# Patient Record
Sex: MALE | Race: BLACK OR AFRICAN AMERICAN | Employment: UNEMPLOYED | URBAN - METROPOLITAN AREA
[De-identification: names, ages, dates, MRNs, and addresses within clinical notes are randomized per-mention and may not be internally consistent; named-entity substitution may affect disease eponyms.]

---

## 2017-03-28 ENCOUNTER — HOSPITAL ENCOUNTER (EMERGENCY)
Age: 57
Discharge: HOME | End: 2017-03-28
Attending: EMERGENCY MEDICINE
Payer: MEDICAID

## 2017-03-28 VITALS
SYSTOLIC BLOOD PRESSURE: 140 MMHG | HEIGHT: 72 IN | RESPIRATION RATE: 18 BRPM | WEIGHT: 225 LBS | TEMPERATURE: 98.3 F | DIASTOLIC BLOOD PRESSURE: 82 MMHG | HEART RATE: 80 BPM | OXYGEN SATURATION: 97 % | BODY MASS INDEX: 30.48 KG/M2

## 2017-03-28 DIAGNOSIS — L02.91 ABSCESS: Primary | ICD-10-CM

## 2017-03-28 PROCEDURE — 99282 EMERGENCY DEPT VISIT SF MDM: CPT

## 2017-03-28 RX ORDER — DOXYCYCLINE HYCLATE 100 MG
100 TABLET ORAL 2 TIMES DAILY
Qty: 14 TAB | Refills: 0 | Status: SHIPPED | OUTPATIENT
Start: 2017-03-28 | End: 2017-04-04

## 2017-03-28 NOTE — ED PROVIDER NOTES
Patient is a 49 y.o. male presenting with abscess. The history is provided by the patient. Abscess      Brooke Marks is a 49 y.o. male presents with abscess under left arm for the past week. States has been draining on it's own. Denies fever or n/v. Denies previous history. Past Medical History:   Diagnosis Date    Diabetes ([address]     Hypertension        Past Surgical History:   Procedure Laterality Date    HX APPENDECTOMY      HX ORTHOPAEDIC      back, neck, left hand x 2         Family History:   Problem Relation Age of Onset    Diabetes Brother        Social History     Social History    Marital status:      Spouse name: N/A    Number of children: N/A    Years of education: N/A     Occupational History    Not on file. Social History Main Topics    Smoking status: Never Smoker    Smokeless tobacco: Never Used    Alcohol use No    Drug use: No    Sexual activity: Not on file     Other Topics Concern    Not on file     Social History Narrative         ALLERGIES: Review of patient's allergies indicates no known allergies. Review of Systems  Constitutional:  Denies malaise, fever, chills. Head:  Denies injury. Face:  Denies injury or pain. ENMT:  Denies sore throat. Neck:  Denies injury or pain. Chest:  Denies injury. Cardiac:  Denies chest pain or palpitations. Respiratory:  Denies cough, wheezing, difficulty breathing, shortness of breath. GI/ABD:  Denies injury, pain, distention, nausea, vomiting, diarrhea. :  Denies injury, pain, dysuria or urgency. Back:  Denies injury or pain. Pelvis:  Denies injury or pain. Extremity/MS:  Denies injury or pain. Neuro:  Denies headache, LOC, dizziness, neurologic symptoms/deficits/paresthesias.    Skin: Abscess    Vitals:    03/28/17 1120   BP: 140/82   Pulse: 80   Resp: 18   Temp: 98.3 °F (36.8 °C)   SpO2: 97%   Weight: 102.1 kg (225 lb)   Height: 5' 11.5\" (1.816 m)            Physical Exam   Nursing note and vitals reviewed. CONSTITUTIONAL: Alert, in no apparent distress; well-developed; well-nourished. HEAD:  Normocephalic, atraumatic. EYES: PERRL; EOM's intact. ENTM: Nose: No rhinorrhea; Throat: mucous membranes moist. Posterior pharynx-normal.  Neck:  No JVD, supple without lymphadenopathy. RESP: Chest clear, equal breath sounds. CV: S1 and S2 WNL; No murmurs, gallops or rubs. GI: Abdomen soft and non-tender. No masses or organomegaly. UPPER EXT:  Normal inspection. LOWER EXT: Normal inspection. NEURO: strength 5/5 and sym, sensation intact. SKIN: Left axilla with well draining non tender, no erythema, no induration. PSYCH:  Alert and oriented, normal affect. MDM  Number of Diagnoses or Management Options  Abscess:   Diagnosis management comments: DDx: eczema/allergic dermatitis/contact dermatitis, erysipelas, bug bites, abscess, cellulitis, viral exanthem, scabies,  skin eruption associated with life-threatening condition  IMPRESSION AND MEDICAL DECISION MAKING:  Based upon the patient's presentation with noted HPI and PE, along with the work up done in the emergency department, I believe that the patient has a well draining abscess. Do not believe need to open further at this time. Will start on Doxy and have him wash thoroughly daily. The patient will be discharged home. Warning signs of worsening condition were discussed and understood by the patient. Based on patient's age, coexisting illness, exam, and the results of this ED evaluation, the decision to treat as an outpatient was made. Based on the information available at time of discharge, acute pathology requiring immediate intervention was deemed relative unlikely. While it is impossible to completely exclude the possibility of underlying serious disease or worsening of condition, I feel the relative likelihood is extremely low.  I discussed this uncertainty with the patient, who understood ED evaluation and treatment and felt comfortable with the outpatient treatment plan. All questions regarding care, test results, and follow up were answered. The patient is stable and appropriate to discharge. They understand that they should return to the emergency department for any new or worsening symptoms. I stressed the importance of follow up for repeat assessment and possibly further evaluation/treatment.         ED Course       Procedures

## 2017-03-28 NOTE — DISCHARGE INSTRUCTIONS

## 2020-12-15 ENCOUNTER — OFFICE VISIT (OUTPATIENT)
Dept: ORTHOPEDIC SURGERY | Age: 60
End: 2020-12-15
Payer: MEDICAID

## 2020-12-15 VITALS
SYSTOLIC BLOOD PRESSURE: 189 MMHG | TEMPERATURE: 96.2 F | DIASTOLIC BLOOD PRESSURE: 97 MMHG | BODY MASS INDEX: 29.47 KG/M2 | WEIGHT: 217.6 LBS | HEART RATE: 70 BPM | HEIGHT: 72 IN | RESPIRATION RATE: 16 BRPM | OXYGEN SATURATION: 98 %

## 2020-12-15 DIAGNOSIS — M79.671 RIGHT FOOT PAIN: ICD-10-CM

## 2020-12-15 DIAGNOSIS — E11.40 NEUROPATHY DUE TO TYPE 2 DIABETES MELLITUS (HCC): Primary | ICD-10-CM

## 2020-12-15 DIAGNOSIS — M79.672 LEFT FOOT PAIN: ICD-10-CM

## 2020-12-15 DIAGNOSIS — L84 SKIN CALLUS: ICD-10-CM

## 2020-12-15 PROCEDURE — 73630 X-RAY EXAM OF FOOT: CPT | Performed by: ORTHOPAEDIC SURGERY

## 2020-12-15 PROCEDURE — 99204 OFFICE O/P NEW MOD 45 MIN: CPT | Performed by: ORTHOPAEDIC SURGERY

## 2020-12-15 PROCEDURE — 11055 PARING/CUTG B9 HYPRKER LES 1: CPT | Performed by: ORTHOPAEDIC SURGERY

## 2020-12-15 RX ORDER — GABAPENTIN 300 MG/1
CAPSULE ORAL
Qty: 21 CAP | Refills: 0 | Status: SHIPPED | OUTPATIENT
Start: 2020-12-15 | End: 2021-01-20

## 2020-12-15 RX ORDER — AMMONIUM LACTATE 5 %
LOTION (GRAM) TOPICAL 2 TIMES DAILY
Qty: 1 BOTTLE | Refills: 0 | Status: SHIPPED | OUTPATIENT
Start: 2020-12-15
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
## 2020-12-15 NOTE — PATIENT INSTRUCTIONS
You have been provided with an order for durable medical equipment that you may  at an outside facility as our office does not carry the equipment you need. You may pick it up at any medical supply company you like. Listed below are a few different locations for your convenience: 
 
[facility] Phone: [phone] [place]:  
[address] [facility], [address] [place], [address] [place]: 
Theresa Daval[facility] [address] [facility] [address] [place]: 
[address] [facility], [address] 
 
bilateral custom diabetic inserts

## 2020-12-18 ENCOUNTER — TELEPHONE (OUTPATIENT)
Dept: ORTHOPEDIC SURGERY | Age: 60
End: 2020-12-18

## 2020-12-18 NOTE — TELEPHONE ENCOUNTER
Contacted patient to reschedule due to provider will be out of the office on 1/18/20. Patient stated Dr. Julius Hernandez was supposed to send a cream to the pharmacist for his foot but it was never submitted.  Patient can be reached at  [phone]

## 2020-12-18 NOTE — TELEPHONE ENCOUNTER
Looks like the Lac Hydrin was sent to his pharmacy.  I spoke to patient and relayed this info and told him to call us back if they need more info from us

## 2021-01-16 DIAGNOSIS — E11.40 NEUROPATHY DUE TO TYPE 2 DIABETES MELLITUS (HCC): ICD-10-CM

## 2021-01-20 RX ORDER — GABAPENTIN 300 MG/1
CAPSULE ORAL
Qty: 21 CAP | Refills: 2 | Status: SHIPPED | OUTPATIENT
Start: 2021-01-20

## 2021-08-13 ENCOUNTER — OFFICE VISIT (OUTPATIENT)
Dept: ORTHOPEDIC SURGERY | Age: 61
End: 2021-08-13
Payer: MEDICAID

## 2021-08-13 VITALS
WEIGHT: 223 LBS | BODY MASS INDEX: 31.22 KG/M2 | TEMPERATURE: 98 F | OXYGEN SATURATION: 98 % | HEIGHT: 71 IN | HEART RATE: 73 BPM | RESPIRATION RATE: 18 BRPM

## 2021-08-13 DIAGNOSIS — M25.562 LEFT KNEE PAIN, UNSPECIFIED CHRONICITY: ICD-10-CM

## 2021-08-13 DIAGNOSIS — M17.12 ARTHRITIS OF LEFT KNEE: Primary | ICD-10-CM

## 2021-08-13 PROCEDURE — 99214 OFFICE O/P EST MOD 30 MIN: CPT | Performed by: PHYSICIAN ASSISTANT

## 2021-08-13 PROCEDURE — 73564 X-RAY EXAM KNEE 4 OR MORE: CPT | Performed by: PHYSICIAN ASSISTANT

## 2021-08-13 NOTE — PROGRESS NOTES
[address] [place] [address]  [phone]           Patient: Rose Faulkner                MRN: [id number]       SSN: [id number]  YOB: 1960        AGE: 63 y.o. SEX: male  Body mass index is 31.1 kg/m². PCP: None  08/13/21      This office note has been dictated. REVIEW OF SYSTEMS:  Constitutional: Negative for fever, chills, weight loss and malaise/fatigue. HENT: Negative. Eyes: Negative. Respiratory: Negative. Cardiovascular: Negative. Gastrointestinal: No bowel incontinence or constipation. Genitourinary: No bladder incontinence or saddle anesthesia. Skin: Negative. Neurological: Negative. Endo/Heme/Allergies: Negative. Psychiatric/Behavioral: Negative. Musculoskeletal: As per HPI above. Past Medical History:   Diagnosis Date    Diabetes ([facility] [address]     Hypertension          Current Outpatient Medications:     gabapentin (NEURONTIN) 300 mg capsule, take 1 capsule by mouth every NIGHT BEFORE BEDTIME for 7 days then 2 capsules every NIGHT BEFORE BEDTIME for THE NEXT 7 DAYS, Disp: 21 Cap, Rfl: 2    L-Mfolate-B6 Phos-Methyl-B12 (METANX) 3-35-2 mg tab tab, Take 1 Tab by mouth daily. , Disp: 30 Tab, Rfl: 0    ranitidine (ZANTAC) 150 mg tablet, Take 1 Tab by mouth two (2) times a day., Disp: 60 Tab, Rfl: 2    cyclobenzaprine (FLEXERIL) 10 mg tablet, Take 1 Tab by mouth two (2) times a day., Disp: 60 Tab, Rfl: 1    metFORMIN (GLUCOPHAGE) 1,000 mg tablet, Take 1 Tab by mouth two (2) times daily (with meals). , Disp: 60 Tab, Rfl: 5    lisinopril (PRINIVIL, ZESTRIL) 10 mg tablet, Take 1 Tab by mouth daily. , Disp: 30 Tab, Rfl: 2    ammonium lactate (Lac-Hydrin Five) 5 % lotion, Apply  to affected area two (2) times a day. (Patient not taking: Reported on 8/13/2021), Disp: 1 Bottle, Rfl: 0    naproxen (NAPROSYN) 500 mg tablet, Take 1 Tab by mouth two (2) times daily (with meals).  (Patient not taking: Reported on 8/13/2021), Disp: 30 Tab, Rfl: 1    No Known Allergies    Social History     Socioeconomic History    Marital status: SINGLE     Spouse name: Not on file    Number of children: Not on file    Years of education: Not on file    Highest education level: Not on file   Occupational History    Not on file   Tobacco Use    Smoking status: Never Smoker    Smokeless tobacco: Never Used   Vaping Use    Vaping Use: Never used   Substance and Sexual Activity    Alcohol use: No    Drug use: No    Sexual activity: Not on file   Other Topics Concern    Not on file   Social History Narrative    Not on file     Social Determinants of Health     Financial Resource Strain:     Difficulty of Paying Living Expenses:    Food Insecurity:     Worried About Running Out of Food in the Last Year:     [address] in the Last Year:    Transportation Needs:     Lack of Transportation (Medical):  Lack of Transportation (Non-Medical):    Physical Activity:     Days of Exercise per Week:     Minutes of Exercise per Session:    Stress:     Feeling of Stress :    Social Connections:     Frequency of Communication with Friends and Family:     Frequency of Social Gatherings with Friends and Family:     Attends Synagogue Services:     Active Member of Clubs or Organizations:     Attends Club or Organization Meetings:     Marital Status:    Intimate Partner Violence:     Fear of Current or Ex-Partner:     Emotionally Abused:     Physically Abused:     Sexually Abused:        Past Surgical History:   Procedure Laterality Date    HX APPENDECTOMY      HX ORTHOPAEDIC      back, neck, left hand x 2           Patient seen and evaluated today with complaints of left knee pain. Patient states she has had some left knee pain and swelling since has been doing physical therapy for his back. He denies any falls. He does report discomfort inside part of his knee which is worse with ambulation.   He does have pseudomechanical symptomatology. He denies any radiating pain down the lower extremity. He does continue on Mobic. Patient denies recent fevers, chills, chest pain, SOB, or injuries. No recent systemic changes noted. A 12-point review of systems is performed today. Pertinent positives are noted. All other systems reviewed and otherwise are negative. Physical exam: General: Alert and oriented x3, nad.  well-developed, well nourished. normal affect, AF. NC/AT, EOMI, neck supple, trachea midline, no JVD present. Breathing is non-labored. Examination of the lower extremities reveals pain-free range of motion the hips. There is no pain to palpation the trochanter bursa. Neck straight leg raise. Negative calf tenderness. Negative Homans. No signs of DVT present. The left knee reveals skin intact. There is no erythema or ecchymosis noted. There is discomfort to palpation medial joint line as well as discomfort Gus's with palpable click present medially. Range of motion is well-preserved. Radiographs obtained in the office today 8/13/2021 at the [facility] location including AP, tunnel, lateral, skyline of the left knee shows no acute bony abnormalities. Assessment: Left knee pain rule out meniscal pathology    Plan: At this point, we will move forward with an MRI of the left knee for further evaluation. We will see him back afterwards for review and treatment options. Arthroscopy was discussed with the patient today in office. He will continue on his Mobic. He will continue with ice therapy. He will continue with Tylenol over-the-counter.             JR Kunal LIN, LEXI, ATC

## 2021-10-29 ENCOUNTER — HOSPITAL ENCOUNTER (OUTPATIENT)
Age: 61
Discharge: HOME | End: 2021-10-29
Attending: PHYSICIAN ASSISTANT
Payer: MEDICAID

## 2021-10-29 DIAGNOSIS — M25.562 LEFT KNEE PAIN, UNSPECIFIED CHRONICITY: ICD-10-CM

## 2021-10-29 PROCEDURE — 73721 MRI JNT OF LWR EXTRE W/O DYE: CPT

## 2021-12-07 ENCOUNTER — OFFICE VISIT (OUTPATIENT)
Dept: ORTHOPEDIC SURGERY | Age: 61
End: 2021-12-07
Payer: MEDICAID

## 2021-12-07 VITALS
OXYGEN SATURATION: 98 % | TEMPERATURE: 97.2 F | HEIGHT: 72 IN | BODY MASS INDEX: 30.96 KG/M2 | HEART RATE: 84 BPM | WEIGHT: 228.6 LBS

## 2021-12-07 DIAGNOSIS — M25.562 LEFT KNEE PAIN, UNSPECIFIED CHRONICITY: ICD-10-CM

## 2021-12-07 DIAGNOSIS — M17.12 ARTHRITIS OF LEFT KNEE: Primary | ICD-10-CM

## 2021-12-07 PROCEDURE — 99214 OFFICE O/P EST MOD 30 MIN: CPT | Performed by: PHYSICIAN ASSISTANT

## 2021-12-07 RX ORDER — MELOXICAM 15 MG/1
15 TABLET ORAL DAILY
Qty: 30 TABLET | Refills: 2 | Status: SHIPPED | OUTPATIENT
Start: 2021-12-07 | End: 2022-08-22

## 2021-12-07 NOTE — PROGRESS NOTES
[address] [place] [address]  [phone]           Patient: Opal Ferreira                MRN: [id number]       SSN: [id number]  YOB: 1960        AGE: 63 y.o. SEX: male  Body mass index is 31.44 kg/m². PCP: None  12/07/21      This office note has been dictated. REVIEW OF SYSTEMS:  Constitutional: Negative for fever, chills, weight loss and malaise/fatigue. HENT: Negative. Eyes: Negative. Respiratory: Negative. Cardiovascular: Negative. Gastrointestinal: No bowel incontinence or constipation. Genitourinary: No bladder incontinence or saddle anesthesia. Skin: Negative. Neurological: Negative. Endo/Heme/Allergies: Negative. Psychiatric/Behavioral: Negative. Musculoskeletal: As per HPI above. Past Medical History:   Diagnosis Date    Diabetes ([facility] [address]     Hypertension          Current Outpatient Medications:     gabapentin (NEURONTIN) 300 mg capsule, take 1 capsule by mouth every NIGHT BEFORE BEDTIME for 7 days then 2 capsules every NIGHT BEFORE BEDTIME for THE NEXT 7 DAYS, Disp: 21 Cap, Rfl: 2    L-Mfolate-B6 Phos-Methyl-B12 (METANX) 3-35-2 mg tab tab, Take 1 Tab by mouth daily. , Disp: 30 Tab, Rfl: 0    ranitidine (ZANTAC) 150 mg tablet, Take 1 Tab by mouth two (2) times a day., Disp: 60 Tab, Rfl: 2    cyclobenzaprine (FLEXERIL) 10 mg tablet, Take 1 Tab by mouth two (2) times a day., Disp: 60 Tab, Rfl: 1    metFORMIN (GLUCOPHAGE) 1,000 mg tablet, Take 1 Tab by mouth two (2) times daily (with meals). , Disp: 60 Tab, Rfl: 5    lisinopril (PRINIVIL, ZESTRIL) 10 mg tablet, Take 1 Tab by mouth daily. , Disp: 30 Tab, Rfl: 2    ammonium lactate (Lac-Hydrin Five) 5 % lotion, Apply  to affected area two (2) times a day. (Patient not taking: Reported on 8/13/2021), Disp: 1 Bottle, Rfl: 0    naproxen (NAPROSYN) 500 mg tablet, Take 1 Tab by mouth two (2) times daily (with meals).  (Patient not taking: Reported on 8/13/2021), Disp: 30 Tab, Rfl: 1    No Known Allergies    Social History     Socioeconomic History    Marital status: SINGLE     Spouse name: Not on file    Number of children: Not on file    Years of education: Not on file    Highest education level: Not on file   Occupational History    Not on file   Tobacco Use    Smoking status: Never Smoker    Smokeless tobacco: Never Used   Vaping Use    Vaping Use: Never used   Substance and Sexual Activity    Alcohol use: No    Drug use: No    Sexual activity: Not on file   Other Topics Concern    Not on file   Social History Narrative    Not on file     Social Determinants of Health     Financial Resource Strain:     Difficulty of Paying Living Expenses: Not on file   Food Insecurity:     Worried About Running Out of Food in the Last Year: Not on file    [place] of Food in the Last Year: Not on file   Transportation Needs:     Lack of Transportation (Medical): Not on file    Lack of Transportation (Non-Medical):  Not on file   Physical Activity:     Days of Exercise per Week: Not on file    Minutes of Exercise per Session: Not on file   Stress:     Feeling of Stress : Not on file   Social Connections:     Frequency of Communication with Friends and Family: Not on file    Frequency of Social Gatherings with Friends and Family: Not on file    Attends Ep[employer] Services: Not on file    Active Member of [address] [place] [address] or Organizations: Not on file    Attends Club or Organization Meetings: Not on file    Marital Status: Not on file   Intimate Partner Violence:     Fear of Current or Ex-Partner: Not on file    Emotionally Abused: Not on file    Physically Abused: Not on file    Sexually Abused: Not on file   Housing Stability:     Unable to Pay for Housing in the Last Year: Not on file    Number of [place] in the Last Year: Not on file    Unstable Housing in the Last Year: Not on file       Past Surgical History:   Procedure Laterality Date    HX APPENDECTOMY      HX ORTHOPAEDIC      back, neck, left hand x 2         Patient seen evaluate today for his left knee. Presents today for MRI review. He still has some discomfort in his knee. Does get some episodes of the muscle give way on him. He has pain to the front of his knee, the inside of his knee as well as the outside of his knee. He denies any radiating pain down the lower extremities. He has been taken ibuprofen over-the-counter. Patient denies recent fevers, chills, chest pain, SOB, or injuries. No recent systemic changes noted. A 12-point review of systems is performed today. Pertinent positives are noted. All other systems reviewed and otherwise are negative. Physical exam: General: Alert and oriented x3, nad.  well-developed, well nourished. normal affect, AF. NC/AT, EOMI, neck supple, trachea midline, no JVD present. Breathing is non-labored. Semination of lower extremities reveals pain-free range of motion hips. There is no pain to palpation trochanter bursa. Neck straight leg raise. Negative calf tenderness. Negative Homans. No signs of DVT present. He does have pain to palpation tricompartmentally about the left knee. It is worst at the medial compartment. He does have some discomfort with Gus's test however negative click present. Mild crepitus anteriorly measured activities noted. Review of MRI left knee:  IMPRESSION  1. Subcentimeter focal full-thickness chondral defect in the central  weightbearing lateral femoral condyle. 2.  Mild fibrillation/radial tear along the body of the lateral meniscus. 3.  Small joint effusion. 4.  Mild extensor mechanism tendinosis with mild edema within the superior  patellar fat pad which may represent quadriceps impingement syndrome    Assessment: Left knee chondral defect, lateral meniscal tear    Plan:  At this point, I will refer him over to Dr. Nestora Sacks for evaluation treatment options of possible arthroscopy for the chondral defect as well as a lateral meniscal tear. Arthroscopy was discussed as well as treatment options post arthroscopy including cortisone as well as viscosupplementation for the arthritic changes. I will start him on Mobic 15 mg once a day with food. He is instructed on use as well as use precautions.           JR Kunal LIN, LEXI, ATC

## 2021-12-21 ENCOUNTER — OFFICE VISIT (OUTPATIENT)
Dept: ORTHOPEDIC SURGERY | Age: 61
End: 2021-12-21
Payer: MEDICAID

## 2021-12-21 VITALS
OXYGEN SATURATION: 98 % | HEIGHT: 72 IN | TEMPERATURE: 97.3 F | BODY MASS INDEX: 30.88 KG/M2 | SYSTOLIC BLOOD PRESSURE: 139 MMHG | WEIGHT: 228 LBS | DIASTOLIC BLOOD PRESSURE: 72 MMHG | HEART RATE: 82 BPM

## 2021-12-21 DIAGNOSIS — S83.282A TEAR OF LATERAL MENISCUS OF LEFT KNEE, CURRENT, UNSPECIFIED TEAR TYPE, INITIAL ENCOUNTER: Primary | ICD-10-CM

## 2021-12-21 PROCEDURE — 99214 OFFICE O/P EST MOD 30 MIN: CPT | Performed by: ORTHOPAEDIC SURGERY

## 2021-12-21 NOTE — PROGRESS NOTES
Trev Granados  1960   Chief Complaint   Patient presents with    Knee Pain     left knee        HISTORY OF PRESENT ILLNESS  Trev Granados is a 63 y.o. male who presents today for evaluation of left knee. He rates his pain 10/10 today. Pain has been present for 6 months. No injury. He has pain with prolonged standing and walking. Patient describes the pain as aching and sharp that is Intermittent in nature. Symptoms are worse with bending, walking and standing and is better with  nothing. Associated symptoms include nothing. Since problem started, it: is unchanged. Pain does not wake patient up at night. Has taken NSAIDs for the problem. Has tried following treatments: Injections:NO; Brace:NO; Therapy:NO; Cane/Crutch:NO       No Known Allergies     Past Medical History:   Diagnosis Date    [facility]     Hypertension     2  Social History     Socioeconomic History    Marital status: SINGLE     Spouse name: Not on file    Number of children: Not on file    Years of education: Not on file    Highest education level: Not on file   Occupational History    Not on file   Tobacco Use    Smoking status: Never Smoker    Smokeless tobacco: Never Used   Vaping Use    Vaping Use: Never used   Substance and Sexual Activity    Alcohol use: No    Drug use: No    Sexual activity: Not on file   Other Topics Concern    Not on file   Social History Narrative    Not on file     Social Determinants of Health     Financial Resource Strain:     Difficulty of Paying Living Expenses: Not on file   Food Insecurity:     Worried About Running Out of Food in the Last Year: Not on file    [place] of Food in the Last Year: Not on file   Transportation Needs:     Lack of Transportation (Medical): Not on file    Lack of Transportation (Non-Medical):  Not on file   Physical Activity:     Days of Exercise per Week: Not on file    Minutes of Exercise per Session: Not on file   Stress:     Feeling of Stress : Not on file   Social Connections:     Frequency of Communication with Friends and Family: Not on file    Frequency of Social Gatherings with Friends and Family: Not on file    Attends Gnosticism Services: Not on file    Active Member of [address] [place] [address] [employer] or Organizations: Not on file    Attends Club or Organization Meetings: Not on file    Marital Status: Not on file   Intimate Partner Violence:     Fear of Current or Ex-Partner: Not on file    Emotionally Abused: Not on file    Physically Abused: Not on file    Sexually Abused: Not on file   Housing Stability:     Unable to Pay for Housing in the Last Year: Not on file    Number of [place] in the Last Year: Not on file    Unstable Housing in the Last Year: Not on file      Past Surgical History:   Procedure Laterality Date    HX APPENDECTOMY      HX ORTHOPAEDIC      back, neck, left hand x 2      Family History   Problem Relation Age of Onset    Diabetes Brother       Current Outpatient Medications   Medication Sig    gabapentin (NEURONTIN) 300 mg capsule take 1 capsule by mouth every NIGHT BEFORE BEDTIME for 7 days then 2 capsules every NIGHT BEFORE BEDTIME for THE NEXT 7 DAYS    L-Mfolate-B6 Phos-Methyl-B12 (METANX) 3-35-2 mg tab tab Take 1 Tab by mouth daily.  ranitidine (ZANTAC) 150 mg tablet Take 1 Tab by mouth two (2) times a day.  metFORMIN (GLUCOPHAGE) 1,000 mg tablet Take 1 Tab by mouth two (2) times daily (with meals).  lisinopril (PRINIVIL, ZESTRIL) 10 mg tablet Take 1 Tab by mouth daily.  meloxicam (Mobic) 15 mg tablet Take 1 Tablet by mouth daily for 90 days. (Patient not taking: Reported on 12/21/2021)    ammonium lactate (Lac-Hydrin Five) 5 % lotion Apply  to affected area two (2) times a day. (Patient not taking: Reported on 8/13/2021)    cyclobenzaprine (FLEXERIL) 10 mg tablet Take 1 Tab by mouth two (2) times a day.  (Patient not taking: Reported on 12/21/2021)    naproxen (NAPROSYN) 500 mg tablet Take 1 Tab by mouth two (2) times daily (with meals). (Patient not taking: Reported on 8/13/2021)     No current facility-administered medications for this visit. REVIEW OF SYSTEM   Patient denies: Weight loss, Fever/Chills, HA, Visual changes, Fatigue, Chest pain, SOB, Abdominal pain, N/V/D/C, Blood in stool or urine, Edema. Pertinent positive as above in HPI. All others were negative    PHYSICAL EXAM:   Visit Vitals  /72 (BP 1 Location: Right arm, BP Patient Position: Sitting, BP Cuff Size: Large adult)   Pulse 82   Temp 97.3 °F (36.3 °C) (Temporal)   Ht 5' 11.5\" (1.816 m)   Wt 228 lb (103.4 kg)   SpO2 98%   BMI 31.36 kg/m²     The patient is a well-developed, well-nourished male   in no acute distress. The patient is alert and oriented times three. The patient is alert and oriented times three. Mood and affect are normal.  LYMPHATIC: lymph nodes are not enlarged and are within normal limits  SKIN: normal in color and non tender to palpation. There are no bruises or abrasions noted. NEUROLOGICAL: Motor sensory exam is within normal limits. Reflexes are equal bilaterally. There is normal sensation to pinprick and light touch  MUSCULOSKELETAL:  Examination Left knee   Skin Intact   Range of motion 0-130   Effusion +   Medial joint line tenderness -   Lateral joint line tenderness +   Tenderness Pes Bursa -   Tenderness insertion MCL -   Tenderness insertion LCL -   Guss +   Patella crepitus +   Patella grind -   Lachman -   Pivot shift -   Anterior drawer -   Posterior drawer -   Varus stress -   Valgus stress -   Neurovascular Intact   Calf Swelling and Tenderness to Palpation -   Yvan's Test -   Hamstring Cord Tightness -          IMAGING: MRI of the left knee dated 10/29/2021 was reviewed and read by Dr. Morales Hammer:   IMPRESSION  1. Subcentimeter focal full-thickness chondral defect in the central  weightbearing lateral femoral condyle.   2.  Mild fibrillation/radial tear along the body of the lateral meniscus. 3.  Small joint effusion. 4.  Mild extensor mechanism tendinosis with mild edema within the superior patellar fat pad which may represent quadriceps impingement syndrome.         IMPRESSION:      ICD-10-CM ICD-9-CM    1. Tear of lateral meniscus of left knee, current, unspecified tear type, initial encounter  S83.282A 836.1         PLAN:  1. I discussed the risks and benefits and potential adverse outcomes of both operative vs non operative treatment of left knee lateral meniscus tear with the patient and patient wishes to proceed with left arthroscopic partial lateral meniscectomy. Risks of operative intervention include but not limited to bleeding, infection, deep vein thrombosis, pulmonary embolism, death, limb length discrepancy, reflexive sympathetic dystrophy, fat embolism syndrome,damage to blood vessels and nerves, malunion, non-union, delayed union, failure of hardware, post traumatic arthritis, stroke, heart attack, and death. Patient understands that infection may arise and may require numerous surgeries. The patient was counseled at length about the risks of patrick Covid-19 during their perioperative period and any recovery window from their procedure. The patient was made aware that patrick Covid-19  may worsen their prognosis for recovering from their procedure and lend to a higher morbidity and/or mortality risk. All material risks, benefits, and reasonable alternatives including postponing the procedure were discussed. The patient does  wish to proceed with the procedure at this time. History and physical exam to be preformed at a later date. Risk factors include: BMI>30, dm ,htn  2. No ultrasound exam indicated today  3. No cortisone injection indicated today   4. No Physical/Occupational Therapy indicated today  5. No diagnostic test indicated today:   6. No durable medical equipment indicated today  7. No referral indicated today   8.  No medications indicated today:   9. No Narcotic indicated today       RTC H&P      Scribed by Andrew Walker) as dictated by MD ANDRIA Burns, Dr. Naldo Cunningham, confirm that all documentation is accurate.     Naldo Cunningham M.D.   [facility]

## 2021-12-27 DIAGNOSIS — Z01.818 PREOP EXAMINATION: Primary | ICD-10-CM

## 2022-09-22 DIAGNOSIS — Z01.818 PRE-OPERATIVE CLEARANCE: Primary | ICD-10-CM

## 2023-07-22 ENCOUNTER — HOSPITAL ENCOUNTER (EMERGENCY)
Facility: HOSPITAL | Age: 63
Discharge: HOME | End: 2023-07-22
Attending: STUDENT IN AN ORGANIZED HEALTH CARE EDUCATION/TRAINING PROGRAM
Payer: MEDICAID

## 2023-07-22 VITALS
OXYGEN SATURATION: 100 % | SYSTOLIC BLOOD PRESSURE: 144 MMHG | WEIGHT: 217 LBS | RESPIRATION RATE: 18 BRPM | TEMPERATURE: 98.1 F | HEART RATE: 63 BPM | BODY MASS INDEX: 30.38 KG/M2 | HEIGHT: 71 IN | DIASTOLIC BLOOD PRESSURE: 99 MMHG

## 2023-07-22 DIAGNOSIS — E11.65 HYPERGLYCEMIA DUE TO DIABETES MELLITUS (HCC): ICD-10-CM

## 2023-07-22 DIAGNOSIS — R10.31 GROIN PAIN, RIGHT: Primary | ICD-10-CM

## 2023-07-22 DIAGNOSIS — R03.0 ELEVATED BLOOD PRESSURE READING: ICD-10-CM

## 2023-07-22 LAB
ANION GAP SERPL CALC-SCNC: 7 MMOL/L (ref 3–18)
APPEARANCE UR: ABNORMAL
BACTERIA URNS QL MICRO: ABNORMAL /HPF
BASOPHILS # BLD: 0 K/UL (ref 0–0.1)
BASOPHILS NFR BLD: 0 % (ref 0–2)
BILIRUB UR QL: NEGATIVE
BUN SERPL-MCNC: 16 MG/DL (ref 7–18)
BUN/CREAT SERPL: 13 (ref 12–20)
CALCIUM SERPL-MCNC: 8.6 MG/DL (ref 8.5–10.1)
CHLORIDE SERPL-SCNC: 105 MMOL/L (ref 100–111)
CO2 SERPL-SCNC: 26 MMOL/L (ref 21–32)
COLOR UR: ABNORMAL
CREAT SERPL-MCNC: 1.2 MG/DL (ref 0.6–1.3)
DIFFERENTIAL METHOD BLD: ABNORMAL
EOSINOPHIL # BLD: 0.1 K/UL (ref 0–0.4)
EOSINOPHIL NFR BLD: 1 % (ref 0–5)
ERYTHROCYTE [DISTWIDTH] IN BLOOD BY AUTOMATED COUNT: 12.5 % (ref 11.6–14.5)
GLUCOSE SERPL-MCNC: 235 MG/DL (ref 74–99)
GLUCOSE UR STRIP.AUTO-MCNC: 500 MG/DL
HCT VFR BLD AUTO: 38.6 % (ref 36–48)
HGB BLD-MCNC: 12.6 G/DL (ref 13–16)
HGB UR QL STRIP: NEGATIVE
IMM GRANULOCYTES # BLD AUTO: 0 K/UL (ref 0–0.04)
IMM GRANULOCYTES NFR BLD AUTO: 0 % (ref 0–0.5)
KETONES UR QL STRIP.AUTO: ABNORMAL MG/DL
LEUKOCYTE ESTERASE UR QL STRIP.AUTO: ABNORMAL
LYMPHOCYTES # BLD: 1.4 K/UL (ref 0.9–3.6)
LYMPHOCYTES NFR BLD: 22 % (ref 21–52)
MCH RBC QN AUTO: 28.3 PG (ref 24–34)
MCHC RBC AUTO-ENTMCNC: 32.6 G/DL (ref 31–37)
MCV RBC AUTO: 86.5 FL (ref 78–100)
MONOCYTES # BLD: 0.5 K/UL (ref 0.05–1.2)
MONOCYTES NFR BLD: 8 % (ref 3–10)
NEUTS SEG # BLD: 4.5 K/UL (ref 1.8–8)
NEUTS SEG NFR BLD: 68 % (ref 40–73)
NITRITE UR QL STRIP.AUTO: NEGATIVE
NRBC # BLD: 0 K/UL (ref 0–0.01)
NRBC BLD-RTO: 0 PER 100 WBC
PH UR STRIP: 6 (ref 5–8)
PLATELET # BLD AUTO: 206 K/UL (ref 135–420)
PMV BLD AUTO: 10.2 FL (ref 9.2–11.8)
POTASSIUM SERPL-SCNC: 4 MMOL/L (ref 3.5–5.5)
PROT UR STRIP-MCNC: 30 MG/DL
RBC # BLD AUTO: 4.46 M/UL (ref 4.35–5.65)
SODIUM SERPL-SCNC: 138 MMOL/L (ref 136–145)
SP GR UR REFRACTOMETRY: 1.02 (ref 1–1.03)
UROBILINOGEN UR QL STRIP.AUTO: 1 EU/DL (ref 0.2–1)
WBC # BLD AUTO: 6.5 K/UL (ref 4.6–13.2)
WBC URNS QL MICRO: ABNORMAL /HPF (ref 0–4)

## 2023-07-22 PROCEDURE — 81001 URINALYSIS AUTO W/SCOPE: CPT

## 2023-07-22 PROCEDURE — 96374 THER/PROPH/DIAG INJ IV PUSH: CPT

## 2023-07-22 PROCEDURE — 6360000002 HC RX W HCPCS: Performed by: STUDENT IN AN ORGANIZED HEALTH CARE EDUCATION/TRAINING PROGRAM

## 2023-07-22 PROCEDURE — 6370000000 HC RX 637 (ALT 250 FOR IP): Performed by: STUDENT IN AN ORGANIZED HEALTH CARE EDUCATION/TRAINING PROGRAM

## 2023-07-22 PROCEDURE — 80048 BASIC METABOLIC PNL TOTAL CA: CPT

## 2023-07-22 PROCEDURE — 85025 COMPLETE CBC W/AUTO DIFF WBC: CPT

## 2023-07-22 PROCEDURE — 99284 EMERGENCY DEPT VISIT MOD MDM: CPT

## 2023-07-22 RX ORDER — KETOROLAC TROMETHAMINE 15 MG/ML
15 INJECTION, SOLUTION INTRAMUSCULAR; INTRAVENOUS ONCE
Status: COMPLETED | OUTPATIENT
Start: 2023-07-22 | End: 2023-07-22

## 2023-07-22 RX ORDER — ACETAMINOPHEN 500 MG
1000 TABLET ORAL
Status: COMPLETED | OUTPATIENT
Start: 2023-07-22 | End: 2023-07-22

## 2023-07-22 RX ADMIN — KETOROLAC TROMETHAMINE 15 MG: 15 INJECTION, SOLUTION INTRAMUSCULAR; INTRAVENOUS at 16:02

## 2023-07-22 RX ADMIN — ACETAMINOPHEN 1000 MG: 500 TABLET ORAL at 16:01

## 2023-07-22 ASSESSMENT — PAIN SCALES - GENERAL
PAINLEVEL_OUTOF10: 10
PAINLEVEL_OUTOF10: 8
PAINLEVEL_OUTOF10: 9
PAINLEVEL_OUTOF10: 10

## 2023-07-22 ASSESSMENT — PAIN - FUNCTIONAL ASSESSMENT
PAIN_FUNCTIONAL_ASSESSMENT: 0-10
PAIN_FUNCTIONAL_ASSESSMENT: 0-10

## 2023-07-22 ASSESSMENT — PAIN DESCRIPTION - PAIN TYPE: TYPE: ACUTE PAIN

## 2023-07-22 ASSESSMENT — PAIN DESCRIPTION - LOCATION: LOCATION: GROIN

## 2023-07-22 NOTE — ED TRIAGE NOTES
Patient is ambulatory into triage for c/o right groin pain x 2 days. Patient says its like \"a pop when he walks. \" Denies any scrotal pain. NAD noted.

## 2023-07-22 NOTE — ED NOTES
C/o right groin pain and intermittent \"popping\" sensation like something is popping in and out with ambulation. Moved to Triage B for better provider assessment of area.       Divya Swartz RN  07/22/23 [id number]